# Patient Record
Sex: MALE | Race: WHITE | URBAN - METROPOLITAN AREA
[De-identification: names, ages, dates, MRNs, and addresses within clinical notes are randomized per-mention and may not be internally consistent; named-entity substitution may affect disease eponyms.]

---

## 2021-08-19 ENCOUNTER — OFFICE VISIT (OUTPATIENT)
Dept: URBAN - METROPOLITAN AREA CLINIC 96 | Facility: CLINIC | Age: 21
End: 2021-08-19

## 2021-09-07 ENCOUNTER — TELEPHONE ENCOUNTER (OUTPATIENT)
Dept: URBAN - METROPOLITAN AREA CLINIC 98 | Facility: CLINIC | Age: 21
End: 2021-09-07

## 2021-09-09 ENCOUNTER — OFFICE VISIT (OUTPATIENT)
Dept: URBAN - METROPOLITAN AREA CLINIC 98 | Facility: CLINIC | Age: 21
End: 2021-09-09
Payer: COMMERCIAL

## 2021-09-09 ENCOUNTER — WEB ENCOUNTER (OUTPATIENT)
Dept: URBAN - METROPOLITAN AREA CLINIC 98 | Facility: CLINIC | Age: 21
End: 2021-09-09

## 2021-09-09 VITALS
WEIGHT: 224.2 LBS | SYSTOLIC BLOOD PRESSURE: 133 MMHG | HEIGHT: 74 IN | DIASTOLIC BLOOD PRESSURE: 86 MMHG | BODY MASS INDEX: 28.77 KG/M2 | TEMPERATURE: 96.8 F | HEART RATE: 81 BPM

## 2021-09-09 DIAGNOSIS — R74.8 ELEVATED LIVER ENZYMES: ICD-10-CM

## 2021-09-09 DIAGNOSIS — K76.0 FATTY (CHANGE OF) LIVER, NOT ELSEWHERE CLASSIFIED: ICD-10-CM

## 2021-09-09 PROCEDURE — 99244 OFF/OP CNSLTJ NEW/EST MOD 40: CPT | Performed by: INTERNAL MEDICINE

## 2021-09-09 NOTE — HPI-TODAY'S VISIT:
HEre on referral from Dr Chidi Barroso for elevated liver tests. A copy of this note will be sent to Dr Barroso's attention.  Pt recently started seeing Dr Barroso: prev alt / ast slightly elevated (~ 50) spring 2021  - we do not have a copy of these records.  Pt says at the time , wt 239 - changed diet by eating less. also cut out sodas.   did start viibryd, otherwise  no new meds or supplements or protein supplement.  - alcohol : 1-2 drinks / week 7/22/21 : alt 97 ast 350  alp 62 tbili 0.4  tsh 1.98 ggt 15 yamileth neg  ferr 37  .  wegovy started in july .  current meds : wegovy, viibryd, vralar, lamotrigine, adderall . has known of fatty liver based on workup ~ 8yr ago.  seen by [facility] : alt 38 ast 24 tbili 0.2 direct 1 alp 83 alb 4.7  negative AAT HCV HBV Jacob AIH IGG4 cholangitis, juv hemochromatosis, celiac  normal ferritin and Hb.  also did have CATRINA which has since resolved.   2/2016 alt 126 ast 52 tbili 0.4  AAT MM .  pt reports feeling well.  is having some weight loss - through continued diet restrictions

## 2021-09-11 ENCOUNTER — DASHBOARD ENCOUNTERS (OUTPATIENT)
Age: 21
End: 2021-09-11

## 2021-09-11 PROBLEM — 197321007: Status: ACTIVE | Noted: 2021-09-11

## 2021-09-27 LAB
A/G RATIO: 1.7
ACTIN (SMOOTH MUSCLE) ANTIBODY: 5
ALBUMIN: 4.7
ALKALINE PHOSPHATASE: 68
ALT (SGPT): 33
ANTI-CENTROMERE B ANTIBODIES: <0.2
ANTI-DNA (DS) AB QN: <1
ANTI-JO-1: <0.2
ANTICHROMATIN ANTIBODIES: <0.2
ANTISCLERODERMA-70 ANTIBODIES: <0.2
AST (SGOT): 24
BASO (ABSOLUTE): 0
BASOS: 0
BILIRUBIN, DIRECT: 0.1
BILIRUBIN, TOTAL: 0.3
BUN/CREATININE RATIO: 7
BUN: 9
CALCIUM: 9.6
CARBON DIOXIDE, TOTAL: 27
CERULOPLASMIN: 27.8
CHLORIDE: 102
CREATININE: 1.29
EGFR IF AFRICN AM: 90
EGFR IF NONAFRICN AM: 78
EOS (ABSOLUTE): 0.1
EOS: 1
FERRITIN, SERUM: 70
GGT: 29
GLOBULIN, TOTAL: 2.8
GLUCOSE: 60
HBSAG SCREEN: NEGATIVE
HCV AB: <0.1
HEMATOCRIT: 44.5
HEMATOLOGY COMMENTS:: (no result)
HEMOGLOBIN: 14.7
HEP A AB, TOTAL: POSITIVE
HEP B CORE AB, TOT: NEGATIVE
HEPATITIS B SURF AB QUANT: 4.7
IMMATURE CELLS: (no result)
IMMATURE GRANS (ABS): 0
IMMATURE GRANULOCYTES: 0
IMMUNOGLOBULIN A, QN, SERUM: 355
IMMUNOGLOBULIN G, QN, SERUM: 1174
IMMUNOGLOBULIN M, QN, SERUM: 109
INTERPRETATION:: (no result)
IRON BIND.CAP.(TIBC): 357
IRON SATURATION: 21
IRON: 76
LYMPHS (ABSOLUTE): 2.5
LYMPHS: 38
Lab: (no result)
MCH: 27.8
MCHC: 33
MCV: 84
MITOCHONDRIAL (M2) ANTIBODY: <20
MONOCYTES(ABSOLUTE): 0.8
MONOCYTES: 12
NEUTROPHILS (ABSOLUTE): 3.2
NEUTROPHILS: 49
NRBC: (no result)
PLATELETS: 430
POTASSIUM: 4.6
PROTEIN, TOTAL: 7.5
RBC: 5.29
RDW: 13.2
RNP ANTIBODIES: 0.2
SJOGREN'S ANTI-SS-A: <0.2
SJOGREN'S ANTI-SS-B: <0.2
SMITH ANTIBODIES: <0.2
SODIUM: 141
UIBC: 281
WBC: 6.6

## 2021-10-01 ENCOUNTER — TELEPHONE ENCOUNTER (OUTPATIENT)
Dept: URBAN - METROPOLITAN AREA CLINIC 98 | Facility: CLINIC | Age: 21
End: 2021-10-01

## 2021-10-28 ENCOUNTER — WEB ENCOUNTER (OUTPATIENT)
Dept: URBAN - METROPOLITAN AREA CLINIC 98 | Facility: CLINIC | Age: 21
End: 2021-10-28

## 2021-11-02 ENCOUNTER — OFFICE VISIT (OUTPATIENT)
Dept: URBAN - METROPOLITAN AREA CLINIC 97 | Facility: CLINIC | Age: 21
End: 2021-11-02